# Patient Record
Sex: FEMALE | URBAN - METROPOLITAN AREA
[De-identification: names, ages, dates, MRNs, and addresses within clinical notes are randomized per-mention and may not be internally consistent; named-entity substitution may affect disease eponyms.]

---

## 2018-10-16 ENCOUNTER — OFFICE VISIT (OUTPATIENT)
Dept: URBAN - METROPOLITAN AREA CLINIC 17 | Facility: CLINIC | Age: 63
End: 2018-10-16
Payer: COMMERCIAL

## 2018-10-16 PROCEDURE — 92083 EXTENDED VISUAL FIELD XM: CPT | Performed by: OPTOMETRIST

## 2018-10-16 PROCEDURE — 99214 OFFICE O/P EST MOD 30 MIN: CPT | Performed by: OPTOMETRIST

## 2018-10-16 PROCEDURE — 92133 CPTRZD OPH DX IMG PST SGM ON: CPT | Performed by: OPTOMETRIST

## 2018-10-16 ASSESSMENT — INTRAOCULAR PRESSURE
OD: 19
OS: 19

## 2018-10-16 NOTE — IMPRESSION/PLAN
Impression: Ocular hypertension, bilateral: H40.053, /567, 8/17 S/p LPI OU Plan: IOP reasonable for ONH appearance OU. Will follow off drops for now. 
OCT- 100/105 (108/107), VF-OD Borderline OS Normal BJW

## 2018-11-05 ENCOUNTER — OFFICE VISIT (OUTPATIENT)
Dept: URBAN - METROPOLITAN AREA CLINIC 17 | Facility: CLINIC | Age: 63
End: 2018-11-05
Payer: COMMERCIAL

## 2018-11-05 PROCEDURE — 92012 INTRM OPH EXAM EST PATIENT: CPT | Performed by: OPTOMETRIST

## 2018-11-05 PROCEDURE — 92015 DETERMINE REFRACTIVE STATE: CPT | Performed by: OPTOMETRIST

## 2018-11-05 ASSESSMENT — INTRAOCULAR PRESSURE
OS: 24
OD: 24

## 2018-11-05 ASSESSMENT — VISUAL ACUITY
OS: 20/20
OD: 20/25

## 2020-06-03 ENCOUNTER — OFFICE VISIT (OUTPATIENT)
Dept: URBAN - METROPOLITAN AREA CLINIC 17 | Facility: CLINIC | Age: 65
End: 2020-06-03
Payer: COMMERCIAL

## 2020-06-03 DIAGNOSIS — H40.053 OCULAR HYPERTENSION, BILATERAL: ICD-10-CM

## 2020-06-03 DIAGNOSIS — H25.13 AGE-RELATED NUCLEAR CATARACT, BILATERAL: ICD-10-CM

## 2020-06-03 DIAGNOSIS — E11.9 TYPE 2 DIABETES MELLITUS W/O COMPLICATION: Primary | ICD-10-CM

## 2020-06-03 PROCEDURE — 92133 CPTRZD OPH DX IMG PST SGM ON: CPT | Performed by: OPTOMETRIST

## 2020-06-03 PROCEDURE — 92014 COMPRE OPH EXAM EST PT 1/>: CPT | Performed by: OPTOMETRIST

## 2020-06-03 PROCEDURE — 92083 EXTENDED VISUAL FIELD XM: CPT | Performed by: OPTOMETRIST

## 2020-06-03 ASSESSMENT — INTRAOCULAR PRESSURE
OS: 21
OS: 20
OD: 19
OD: 18

## 2020-06-03 NOTE — IMPRESSION/PLAN
Impression: Type 2 diabetes mellitus w/o complication: O02.1. OU. Plan: Diabetes type II: no background retinopathy, no signs of neovascularization noted. Discussed ocular and systemic benefits of blood sugar control.

## 2020-06-03 NOTE — IMPRESSION/PLAN
Impression: Ocular hypertension, bilateral: H40.053, /567, 8/17 S/p LPI OU Plan: Will follow off drops for now. 
OCT- 103/107(100/105)(108/107), VF- OD scattered misses unreliable OS borderline BJW

## 2020-09-02 ENCOUNTER — OFFICE VISIT (OUTPATIENT)
Dept: URBAN - METROPOLITAN AREA CLINIC 17 | Facility: CLINIC | Age: 65
End: 2020-09-02
Payer: COMMERCIAL

## 2020-09-02 DIAGNOSIS — H52.4 PRESBYOPIA: Primary | ICD-10-CM

## 2020-09-02 PROCEDURE — 92012 INTRM OPH EXAM EST PATIENT: CPT | Performed by: OPTOMETRIST

## 2020-09-02 ASSESSMENT — INTRAOCULAR PRESSURE
OS: 16
OS: 27
OD: 21
OD: 14

## 2020-09-02 ASSESSMENT — VISUAL ACUITY
OS: 20/20
OD: 20/25

## 2021-02-05 ENCOUNTER — OFFICE VISIT (OUTPATIENT)
Dept: URBAN - METROPOLITAN AREA CLINIC 17 | Facility: CLINIC | Age: 66
End: 2021-02-05
Payer: MEDICARE

## 2021-02-05 PROCEDURE — 99213 OFFICE O/P EST LOW 20 MIN: CPT | Performed by: OPTOMETRIST

## 2021-02-05 ASSESSMENT — INTRAOCULAR PRESSURE
OS: 21
OD: 19

## 2021-02-05 NOTE — IMPRESSION/PLAN
Impression: Ocular hypertension, bilateral: H40.053, /567, 8/17 S/p LPI OU, IOP stable OU Plan: Will follow off drops for now. 
OCT- 103/107(100/105)(108/107), VF- OD scattered misses unreliable OS borderline BJW

## 2021-08-06 ENCOUNTER — OFFICE VISIT (OUTPATIENT)
Dept: URBAN - METROPOLITAN AREA CLINIC 17 | Facility: CLINIC | Age: 66
End: 2021-08-06
Payer: MEDICARE

## 2021-08-06 PROCEDURE — 92014 COMPRE OPH EXAM EST PT 1/>: CPT | Performed by: OPTOMETRIST

## 2021-08-06 PROCEDURE — 92083 EXTENDED VISUAL FIELD XM: CPT | Performed by: OPTOMETRIST

## 2021-08-06 PROCEDURE — 92133 CPTRZD OPH DX IMG PST SGM ON: CPT | Performed by: OPTOMETRIST

## 2021-08-06 ASSESSMENT — KERATOMETRY
OD: 44.00
OS: 44.00

## 2021-08-06 ASSESSMENT — INTRAOCULAR PRESSURE
OS: 19
OS: 16
OD: 14
OD: 19

## 2021-08-06 NOTE — IMPRESSION/PLAN
Impression: Type 2 diabetes mellitus w/o complication: N94.3. Plan: Diabetes type II: no background retinopathy, no signs of neovascularization noted. Discussed ocular and systemic benefits of blood sugar control.

## 2021-08-06 NOTE — IMPRESSION/PLAN
Impression: Ocular hypertension, bilateral: H40.053, /567, 8/17 S/p LPI OU, IOP stable OU Plan: Will follow off drops for now. 
OCT- 103/101 (103/107)(100/105)(108/107), VF- OD sup inf luther misses unreliable OS sup inf misses BJW

## 2022-08-08 ENCOUNTER — OFFICE VISIT (OUTPATIENT)
Dept: URBAN - METROPOLITAN AREA CLINIC 17 | Facility: CLINIC | Age: 67
End: 2022-08-08
Payer: MEDICARE

## 2022-08-08 DIAGNOSIS — H25.13 AGE-RELATED NUCLEAR CATARACT, BILATERAL: ICD-10-CM

## 2022-08-08 DIAGNOSIS — E11.9 TYPE 2 DIABETES MELLITUS W/O COMPLICATION: Primary | ICD-10-CM

## 2022-08-08 DIAGNOSIS — H40.053 OCULAR HYPERTENSION, BILATERAL: ICD-10-CM

## 2022-08-08 PROCEDURE — 99214 OFFICE O/P EST MOD 30 MIN: CPT | Performed by: OPTOMETRIST

## 2022-08-08 PROCEDURE — 92133 CPTRZD OPH DX IMG PST SGM ON: CPT | Performed by: OPTOMETRIST

## 2022-08-08 ASSESSMENT — INTRAOCULAR PRESSURE
OD: 19
OS: 19

## 2022-08-08 NOTE — IMPRESSION/PLAN
Continue: timolol maleate (timolol maleate): drops: 0.5% 06- Impression: Ocular hypertension, bilateral: H40.053, /567, 8/17 S/p LPI OU, IOP stable OU Plan: Stable w/o treatment. Monitor. Call if Va worsens. OCT ordered and reviewed w/pt today. F/u 1yr or sooner.

## 2022-08-08 NOTE — IMPRESSION/PLAN
Impression: Type 2 diabetes mellitus w/o complication: U60.7. Bilateral. Plan: Diabetes type II: no background retinopathy, no signs of neovascularization noted. Discussed ocular and systemic benefits of blood sugar control.

## 2022-09-28 ENCOUNTER — OFFICE VISIT (OUTPATIENT)
Dept: URBAN - METROPOLITAN AREA CLINIC 17 | Facility: CLINIC | Age: 67
End: 2022-09-28
Payer: MEDICARE

## 2022-09-28 DIAGNOSIS — H52.4 PRESBYOPIA: Primary | ICD-10-CM

## 2022-09-28 PROCEDURE — 92012 INTRM OPH EXAM EST PATIENT: CPT | Performed by: OPTOMETRIST

## 2022-09-28 ASSESSMENT — INTRAOCULAR PRESSURE
OD: 27
OS: 27

## 2022-09-28 ASSESSMENT — VISUAL ACUITY
OD: 20/25
OS: 20/25

## 2023-05-05 ENCOUNTER — OFFICE VISIT (OUTPATIENT)
Dept: URBAN - METROPOLITAN AREA CLINIC 17 | Facility: CLINIC | Age: 68
End: 2023-05-05
Payer: MEDICARE

## 2023-05-05 DIAGNOSIS — L03.213 PRESEPTAL CELLULITIS: Primary | ICD-10-CM

## 2023-05-05 DIAGNOSIS — H04.123 DRY EYE SYNDROME OF BILATERAL LACRIMAL GLANDS: ICD-10-CM

## 2023-05-05 PROCEDURE — 99214 OFFICE O/P EST MOD 30 MIN: CPT

## 2023-05-05 RX ORDER — CEPHALEXIN 500 MG/1
500 MG CAPSULE ORAL
Qty: 14 | Refills: 0 | Status: ACTIVE
Start: 2023-05-05

## 2023-05-05 ASSESSMENT — INTRAOCULAR PRESSURE
OS: 18
OD: 18

## 2023-05-05 NOTE — IMPRESSION/PLAN
Impression: Preseptal cellulitis: L03.213.
-OS
-mild swelling and redness on lid
-denies trauma -EOMS full, (-)APD OD, OS Plan: Patient educated on findings. Recommend keflex 500mg BID for 7 days. Return to clinic if patient notes any changes in vision or worsening pain.

## 2023-05-12 ENCOUNTER — OFFICE VISIT (OUTPATIENT)
Dept: URBAN - METROPOLITAN AREA CLINIC 17 | Facility: CLINIC | Age: 68
End: 2023-05-12
Payer: MEDICARE

## 2023-05-12 DIAGNOSIS — L03.213 PRESEPTAL CELLULITIS: Primary | ICD-10-CM

## 2023-05-12 PROCEDURE — 99213 OFFICE O/P EST LOW 20 MIN: CPT

## 2023-05-12 ASSESSMENT — INTRAOCULAR PRESSURE
OS: 16
OD: 17

## 2023-05-12 NOTE — IMPRESSION/PLAN
Impression: Preseptal cellulitis: L03.213.
-OS
-mild swelling and redness on lid
-denies trauma -EOMS full, (-)APD OD, OS
-symptoms are not improving
-patient will have MRI of head and orbit on tuesday Plan: Patient educated on findings. Continue with keflex until end. Return to clinic if patient notes any changes in vision or worsening pain. Otherwise, if MRI does not find any mass and pain continues, return to clinic after MRI has been completed.